# Patient Record
Sex: MALE | Race: WHITE | NOT HISPANIC OR LATINO | URBAN - METROPOLITAN AREA
[De-identification: names, ages, dates, MRNs, and addresses within clinical notes are randomized per-mention and may not be internally consistent; named-entity substitution may affect disease eponyms.]

---

## 2020-03-09 ENCOUNTER — EMERGENCY (EMERGENCY)
Facility: HOSPITAL | Age: 55
LOS: 1 days | Discharge: HOME | End: 2020-03-09
Attending: EMERGENCY MEDICINE | Admitting: EMERGENCY MEDICINE
Payer: SELF-PAY

## 2020-03-09 VITALS
HEART RATE: 78 BPM | RESPIRATION RATE: 16 BRPM | TEMPERATURE: 98 F | SYSTOLIC BLOOD PRESSURE: 124 MMHG | DIASTOLIC BLOOD PRESSURE: 82 MMHG | OXYGEN SATURATION: 98 %

## 2020-03-09 VITALS
SYSTOLIC BLOOD PRESSURE: 135 MMHG | RESPIRATION RATE: 16 BRPM | DIASTOLIC BLOOD PRESSURE: 79 MMHG | HEART RATE: 74 BPM | OXYGEN SATURATION: 99 % | WEIGHT: 205.03 LBS | TEMPERATURE: 98 F | HEIGHT: 76 IN

## 2020-03-09 DIAGNOSIS — L51.3 STEVENS-JOHNSON SYNDROME-TOXIC EPIDERMAL NECROLYSIS OVERLAP SYNDROME: ICD-10-CM

## 2020-03-09 DIAGNOSIS — R41.0 DISORIENTATION, UNSPECIFIED: ICD-10-CM

## 2020-03-09 LAB
ALBUMIN SERPL ELPH-MCNC: 3.7 G/DL — SIGNIFICANT CHANGE UP (ref 3.4–5)
ALP SERPL-CCNC: 51 U/L — SIGNIFICANT CHANGE UP (ref 40–120)
ALT FLD-CCNC: 17 U/L — SIGNIFICANT CHANGE UP (ref 12–42)
ANION GAP SERPL CALC-SCNC: 7 MMOL/L — LOW (ref 9–16)
APTT BLD: 27.3 SEC — LOW (ref 27.5–36.3)
AST SERPL-CCNC: 8 U/L — LOW (ref 15–37)
BILIRUB SERPL-MCNC: 0.2 MG/DL — SIGNIFICANT CHANGE UP (ref 0.2–1.2)
BUN SERPL-MCNC: 17 MG/DL — SIGNIFICANT CHANGE UP (ref 7–23)
CALCIUM SERPL-MCNC: 9.1 MG/DL — SIGNIFICANT CHANGE UP (ref 8.5–10.5)
CHLORIDE SERPL-SCNC: 102 MMOL/L — SIGNIFICANT CHANGE UP (ref 96–108)
CO2 SERPL-SCNC: 31 MMOL/L — SIGNIFICANT CHANGE UP (ref 22–31)
CREAT SERPL-MCNC: 1.09 MG/DL — SIGNIFICANT CHANGE UP (ref 0.5–1.3)
ETHANOL SERPL-MCNC: <3 MG/DL — SIGNIFICANT CHANGE UP
GLUCOSE SERPL-MCNC: 95 MG/DL — SIGNIFICANT CHANGE UP (ref 70–99)
HCT VFR BLD CALC: 43.5 % — SIGNIFICANT CHANGE UP (ref 39–50)
HGB BLD-MCNC: 14.9 G/DL — SIGNIFICANT CHANGE UP (ref 13–17)
INR BLD: 0.94 — SIGNIFICANT CHANGE UP (ref 0.88–1.16)
LACTATE SERPL-SCNC: 1.5 MMOL/L — SIGNIFICANT CHANGE UP (ref 0.4–2)
MCHC RBC-ENTMCNC: 33 PG — SIGNIFICANT CHANGE UP (ref 27–34)
MCHC RBC-ENTMCNC: 34.3 GM/DL — SIGNIFICANT CHANGE UP (ref 32–36)
MCV RBC AUTO: 96.5 FL — SIGNIFICANT CHANGE UP (ref 80–100)
NRBC # BLD: 0 /100 WBCS — SIGNIFICANT CHANGE UP (ref 0–0)
PLATELET # BLD AUTO: 434 K/UL — HIGH (ref 150–400)
POTASSIUM SERPL-MCNC: 3.4 MMOL/L — LOW (ref 3.5–5.3)
POTASSIUM SERPL-SCNC: 3.4 MMOL/L — LOW (ref 3.5–5.3)
PROT SERPL-MCNC: 7.3 G/DL — SIGNIFICANT CHANGE UP (ref 6.4–8.2)
PROTHROM AB SERPL-ACNC: 10.4 SEC — SIGNIFICANT CHANGE UP (ref 10–12.9)
RBC # BLD: 4.51 M/UL — SIGNIFICANT CHANGE UP (ref 4.2–5.8)
RBC # FLD: 13 % — SIGNIFICANT CHANGE UP (ref 10.3–14.5)
SODIUM SERPL-SCNC: 140 MMOL/L — SIGNIFICANT CHANGE UP (ref 132–145)
WBC # BLD: 14.75 K/UL — HIGH (ref 3.8–10.5)
WBC # FLD AUTO: 14.75 K/UL — HIGH (ref 3.8–10.5)

## 2020-03-09 PROCEDURE — 99284 EMERGENCY DEPT VISIT MOD MDM: CPT

## 2020-03-09 PROCEDURE — 99053 MED SERV 10PM-8AM 24 HR FAC: CPT

## 2020-03-09 RX ORDER — MORPHINE SULFATE 50 MG/1
2 CAPSULE, EXTENDED RELEASE ORAL ONCE
Refills: 0 | Status: DISCONTINUED | OUTPATIENT
Start: 2020-03-09 | End: 2020-03-09

## 2020-03-09 RX ORDER — SODIUM CHLORIDE 9 MG/ML
1000 INJECTION INTRAMUSCULAR; INTRAVENOUS; SUBCUTANEOUS ONCE
Refills: 0 | Status: COMPLETED | OUTPATIENT
Start: 2020-03-09 | End: 2020-03-09

## 2020-03-09 RX ORDER — DIAZEPAM 5 MG
10 TABLET ORAL ONCE
Refills: 0 | Status: DISCONTINUED | OUTPATIENT
Start: 2020-03-09 | End: 2020-03-09

## 2020-03-09 RX ADMIN — MORPHINE SULFATE 2 MILLIGRAM(S): 50 CAPSULE, EXTENDED RELEASE ORAL at 04:42

## 2020-03-09 RX ADMIN — Medication 125 MILLIGRAM(S): at 03:51

## 2020-03-09 RX ADMIN — SODIUM CHLORIDE 1000 MILLILITER(S): 9 INJECTION INTRAMUSCULAR; INTRAVENOUS; SUBCUTANEOUS at 03:56

## 2020-03-09 NOTE — ED ADULT NURSE REASSESSMENT NOTE - NS ED NURSE REASSESS COMMENT FT1
Patient now demanding to leave. Educated about the risks and benefits of staying in the hospital and waiting for transport, verbalizes understanding and still wants to leave. Attending Wilton aware and notified, at the bedside for AMA paperwork to be signed. IV removed, VSS. Told to follow up with PCP and return for worsening sx or go to [facility] ED for further tx. Patient now demanding to leave. Educated about the risks and benefits of not staying in the hospital and waiting for transport, verbalizes understanding and still wants to leave. Attending Wilton aware and notified, at the bedside for AMA paperwork to be signed. IV removed, VSS. Told to follow up with PCP and return for worsening sx or go to [facility] ED for further tx.

## 2020-03-09 NOTE — ED PROVIDER NOTE - CARE PLAN
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
## 2020-03-09 NOTE — ED PROVIDER NOTE - CLINICAL SUMMARY MEDICAL DECISION MAKING FREE TEXT BOX
TENS/SJS of penis due to Cialis, d/w PMD recommends xfer to center with Burn service, called [facility] through [facility] Patient states he has Carrizales Johsnon and TENS of penis area.  States he has this because of cialis.  Called his PMD who states patient may be admitted and recommended [facility] and to call burn service.  On physical exam patient does not have sloughing or scaling of skin on body or genitals.  Has some darker discoloration of penis and scrotum.  No erythema.  No erythema of body.  Called [facility] and d/w burn surgeon who recommends admission to medical floor.  [facility] transfer center states there are no beds at [facility] but they are awaiting call back from medicine attending.  [facility] offered to place patient at [facility] and patient refused transfer to [facility].  D/w his PMD and recommended solu-medrol IV which was given.  Patient requestes benzodiazepines for anxiety and a morphine 'drip'.  Given po Valium and 2mg IV morphine and patient slept while awaiting transfer team call back.  at 6am patient stated he wants to leave ED against medical advice and wants a prescription for sleeping medication.  This request was deferred to his primary care doctor.  Called his primary care doctor back to attempt to arrange follow up and inquire about patient's request for sleeping medication.  Patient informed that if this is a Carrizales Huy or TENS he should be admitted to hospital and he states he understands that there codd be loss of life or limb or genitalia because of this but insists on leaving against medical advice at this time and will follow up with his private doctor.

## 2020-03-09 NOTE — ED ADULT NURSE REASSESSMENT NOTE - NS ED NURSE REASSESS COMMENT FT1
Patient refusing 10mg oral valium. States "my doctor gives to 10 bars of xanax, this won't do anything". Demanding benzo and morphine drip at this time. Attending Gamaliel notified and aware.

## 2020-03-09 NOTE — ED PROVIDER NOTE - REFUSAL OF SERVICE, MDM
Patient is awake, alert, oriented to person, place and time of day and declines further care at this time and declines to be trasferred to [facility] because there are no beds at [facility].

## 2020-03-09 NOTE — ED ADULT NURSE NOTE - CHPI ED NUR SYMPTOMS NEG
no confusion/no decreased eating/drinking/no scaly patches on skin/no vomiting/no body aches/no chills/no petechia/no inflammation/no itching/no fever slough/no chills/no petechia/no vomiting/no body aches/no scaly patches on skin/no itching/no fever/no inflammation/no decreased eating/drinking/no confusion

## 2020-03-09 NOTE — ED ADULT NURSE NOTE - OBJECTIVE STATEMENT
54y male presents to ED for medical evaluation. Hx of Zachary Huy syndrome, endorses +discoloration to penis and scrotum. 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 F/u with Dr Barno Reis who prescribed prednisone and states "it is not working". Denies sob, cp, n/v, fevers, chills.

## 2020-03-09 NOTE — ED PROVIDER NOTE - PATIENT PORTAL LINK FT
You can access the FollowMyHealth Patient Portal offered by [facility] by registering at the following website: [email or web address]. By joining [facility]’s FollowMyHealth portal, you will also be able to view your health information using other applications (apps) compatible with our system.

## 2020-03-09 NOTE — ED PROVIDER NOTE - NSFOLLOWUPINSTRUCTIONS_ED_ALL_ED_FT
Please follow up with your private doctor today or go to emergency at any time to continue your treatment.  If you decide to go to [facility], you may inform them that you were seen at [facility] and that we attempted to transfer you there today.
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
